# Patient Record
Sex: FEMALE | Race: AMERICAN INDIAN OR ALASKA NATIVE | NOT HISPANIC OR LATINO | Employment: OTHER | URBAN - METROPOLITAN AREA
[De-identification: names, ages, dates, MRNs, and addresses within clinical notes are randomized per-mention and may not be internally consistent; named-entity substitution may affect disease eponyms.]

---

## 2017-06-09 ENCOUNTER — HOSPITAL ENCOUNTER (OUTPATIENT)
Dept: RADIOLOGY | Facility: HOSPITAL | Age: 67
Discharge: HOME | End: 2017-06-09
Attending: NURSE PRACTITIONER
Payer: MEDICARE

## 2017-06-09 DIAGNOSIS — Z12.31 ENCOUNTER FOR SCREENING MAMMOGRAM FOR BREAST CANCER: ICD-10-CM

## 2017-06-09 PROCEDURE — 77063 BREAST TOMOSYNTHESIS BI: CPT | Mod: 26,,, | Performed by: RADIOLOGY

## 2017-06-09 PROCEDURE — 77067 SCR MAMMO BI INCL CAD: CPT | Mod: TC

## 2017-06-09 PROCEDURE — 77067 SCR MAMMO BI INCL CAD: CPT | Mod: 26,,, | Performed by: RADIOLOGY

## 2017-06-27 DIAGNOSIS — M85.80 OSTEOPENIA WITH HIGH RISK OF FRACTURE: ICD-10-CM

## 2017-06-28 RX ORDER — IBANDRONATE SODIUM 150 MG/1
TABLET, FILM COATED ORAL
Qty: 1 TABLET | Refills: 10 | OUTPATIENT
Start: 2017-06-28

## 2017-06-30 DIAGNOSIS — M85.80 OSTEOPENIA WITH HIGH RISK OF FRACTURE: ICD-10-CM

## 2017-06-30 RX ORDER — IBANDRONATE SODIUM 150 MG/1
150 TABLET, FILM COATED ORAL
Qty: 1 TABLET | Refills: 0 | Status: SHIPPED | OUTPATIENT
Start: 2017-06-30 | End: 2018-06-30

## 2017-06-30 NOTE — TELEPHONE ENCOUNTER
Angela desire refill of Boniva. Annual exam scheduled.   Patient Active Problem List   Diagnosis    Hypertension    Osteopenia    Colon cancer screening    Screening for colorectal cancer     Prior to Admission medications    Medication Sig Start Date End Date Taking? Authorizing Provider   albuterol-ipratropium 2.5mg-0.5mg/3mL (DUO-NEB) 0.5 mg-3 mg(2.5 mg base)/3 mL nebulizer solution every 6 (six) hours as needed.  4/21/14   Historical Provider, MD   b complex vitamins tablet Take 1 tablet by mouth once daily.    Historical Provider, MD   cholecalciferol, vitamin D3, (VITAMIN D3) 2,000 unit Tab Take 1 tablet by mouth once daily.    Historical Provider, MD   DEPO-ESTRADIOL 5 mg/mL injection Inject 1 mL (5 mg total) into the muscle every 28 days. 6/5/15   Ashley Narayanan MD   ibandronate (BONIVA) 150 mg tablet Take 1 tablet (150 mg total) by mouth every 30 days. 6/21/16 6/21/17  Ashley Narayanan MD   losartan-hydrochlorothiazide 50-12.5 mg (HYZAAR) 50-12.5 mg per tablet Take 1 tablet by mouth once daily.    Historical Provider, MD   multivitamin with minerals tablet Take 1 tablet by mouth once daily.    Historical Provider, MD

## 2017-09-16 ENCOUNTER — HOSPITAL ENCOUNTER (EMERGENCY)
Facility: HOSPITAL | Age: 67
Discharge: HOME | End: 2017-09-16
Attending: SURGERY
Payer: MEDICARE

## 2017-09-16 VITALS
DIASTOLIC BLOOD PRESSURE: 75 MMHG | BODY MASS INDEX: 29.09 KG/M2 | WEIGHT: 183 LBS | SYSTOLIC BLOOD PRESSURE: 140 MMHG | TEMPERATURE: 97 F | HEART RATE: 68 BPM | RESPIRATION RATE: 18 BRPM

## 2017-09-16 DIAGNOSIS — M25.569 KNEE PAIN: ICD-10-CM

## 2017-09-16 PROCEDURE — 99283 EMERGENCY DEPT VISIT LOW MDM: CPT

## 2017-09-16 RX ORDER — KETOROLAC TROMETHAMINE 10 MG/1
10 TABLET, FILM COATED ORAL EVERY 6 HOURS PRN
Qty: 15 TABLET | Refills: 0 | Status: SHIPPED | OUTPATIENT
Start: 2017-09-16

## 2017-09-16 RX ORDER — CYCLOBENZAPRINE HCL 10 MG
10 TABLET ORAL 3 TIMES DAILY PRN
Qty: 10 TABLET | Refills: 0 | Status: SHIPPED | OUTPATIENT
Start: 2017-09-16 | End: 2017-09-21

## 2017-09-16 NOTE — ED PROVIDER NOTES
[facility]                                     September 16, 2017                   Chief Complaint  66 y.o. female with Knee Pain (right)    History of Present Illness  Angela Veliz presents to the emergency room with right knee pain today  Patient on his monitor slip and fall or getting off of her scale, hit her right knee at home  Patient on exam has a normal right knee without bruising or deformity noted in the ER   Patient has a negative anterior and posterior drawer sign, no instability appreciated now  Patient has good distal pulses and capillary refill, neurovascular intact on evaluation here    The history is provided by the patient  Medical Hx: Arthritis, asthma, difficult intubation right shin, osteopenia  Surgical Hx: Bartholin's cyst, abscess, colonoscopy, D&C, RODRIGO, joint replacement, tonsillectomy  ALLERGIES: Iodine    Review of Systems and Physical Exam     Review of Systems  -- Constitution - no fever, denies fatigue, no weakness, no chills  -- Eyes - no tearing or redness, no visual disturbance  -- Ear, Nose - no tinnitus or earache, no nasal congestion or discharge  -- Mouth,Throat - no sore throat, no toothache, normal voice, normal swallowing  -- Respiratory - denies cough and congestion, no shortness of breath, no MAX  -- Cardiovascular - denies chest pain, no palpitations, denies claudication  -- Gastrointestinal - denies abdominal pain, nausea, vomiting, or diarrhea  -- Musculoskeletal - left knee pain after a trip and fall today   -- Neurological - no headache, denies weakness or seizure; no LOC  -- Skin - denies pallor, rash, or changes in skin. no hives or welts noted    Vital Signs  -- Her blood pressure is 140/75 and her pulse is 68. Her respiration is 18.      Physical Exam  -- Nursing note and vitals reviewed  -- Constitutional: Appears well-developed and well-nourished  -- Head: Atraumatic. Normocephalic. No obvious abnormality  -- Eyes: Pupils are  equal and reactive to light. Normal conjunctiva and lids  -- Cardiac: Normal rate, regular rhythm and normal heart sounds  -- Pulmonary: Normal respiratory effort, breath sounds clear to auscultation  -- Abdominal: Soft, no tenderness. Normal bowel sounds. Normal liver edge  -- Musculoskeletal: Normal range of motion, no effusions. Joints stable   -- Neurological: No focal deficits. Showed good interaction with staff  -- Vascular: Posterior tibial, dorsalis pedis and radial pulses 2+ bilaterally      Emergency Room Course     Treatment and Evaluation  -- Preliminary ER x-ray readings showed no evidence of fracture or dislocation  -- All x-rays are reviewed with a final disposition given by the radiologist   -- A knee immobilizer is placed on the affected knee by the CNA  -- Crutches were also given and taught for ambulation      Diagnosis  -- The encounter diagnosis was Knee pain.    Disposition and Plan  -- Disposition: home  -- Condition: stable  -- Follow-up: Patient to follow up with Rigoberto Koenig MD in 1-2 days.  -- I advised the patient that we have found no life threatening condition today  -- At this time, I believe the patient is clinically stable for discharge.   -- The patient acknowledges that close follow up with a MD is required   -- Patient agrees to comply with all instruction and direction given in the ER    This note is dictated on Dragon Natural Speaking word recognition program.  There are word recognition mistakes that are occasionally missed on review.           Mikey Garza MD  09/16/17 9443

## 2017-09-16 NOTE — ED TRIAGE NOTES
"Patient c/o right knee pain that began "about a month ago but it went away and started up again."  "

## 2017-09-16 NOTE — ED NOTES
Discharge instructions/escript instructions given to patient, she voiced understanding.  Discharged to home in stable condition/ambulatory w steady gait to discharge window using crutches.  Assisted w carrying personal items and knee immobilizer to discharge window and to POV per TOMAS Llanes, PCT.  NAD.

## 2017-09-16 NOTE — ED NOTES
Knee immobilizer given to patient w instructions r/t patient does not have a ride home and will be driving herself.  Patient voiced understanding of application of knee immobilizer.

## 2018-07-03 ENCOUNTER — HOSPITAL ENCOUNTER (OUTPATIENT)
Dept: RADIOLOGY | Facility: HOSPITAL | Age: 68
Discharge: HOME | End: 2018-07-03
Attending: NURSE PRACTITIONER
Payer: MEDICARE

## 2018-07-03 VITALS — WEIGHT: 183 LBS | BODY MASS INDEX: 28.72 KG/M2 | HEIGHT: 67 IN

## 2018-07-03 DIAGNOSIS — Z78.0 ASYMPTOMATIC MENOPAUSAL STATE: ICD-10-CM

## 2018-07-03 DIAGNOSIS — Z12.39 SCREENING FOR MALIGNANT NEOPLASM OF BREAST: ICD-10-CM

## 2018-07-03 PROCEDURE — 77080 DXA BONE DENSITY AXIAL: CPT | Mod: TC

## 2018-07-03 PROCEDURE — 77080 DXA BONE DENSITY AXIAL: CPT | Mod: 26,,, | Performed by: RADIOLOGY

## 2018-07-03 PROCEDURE — 77063 BREAST TOMOSYNTHESIS BI: CPT | Mod: 26,,, | Performed by: RADIOLOGY

## 2018-07-03 PROCEDURE — 77067 SCR MAMMO BI INCL CAD: CPT | Mod: TC

## 2018-07-03 PROCEDURE — 77067 SCR MAMMO BI INCL CAD: CPT | Mod: 26,,, | Performed by: RADIOLOGY

## 2019-01-12 ENCOUNTER — HOSPITAL ENCOUNTER (INPATIENT)
Facility: HOSPITAL | Age: 69
LOS: 1 days | Discharge: HOME | DRG: 390 | End: 2019-01-13
Attending: SURGERY | Admitting: SURGERY
Payer: MEDICARE

## 2019-01-12 ENCOUNTER — HOSPITAL ENCOUNTER (EMERGENCY)
Facility: HOSPITAL | Age: 69
Discharge: ANOTHER HOSPITAL | End: 2019-01-12
Attending: INTERNAL MEDICINE
Payer: MEDICARE

## 2019-01-12 VITALS
DIASTOLIC BLOOD PRESSURE: 72 MMHG | WEIGHT: 165 LBS | RESPIRATION RATE: 19 BRPM | SYSTOLIC BLOOD PRESSURE: 138 MMHG | HEART RATE: 61 BPM | BODY MASS INDEX: 26.52 KG/M2 | HEIGHT: 66 IN | TEMPERATURE: 97 F | OXYGEN SATURATION: 100 %

## 2019-01-12 DIAGNOSIS — R10.9 ABDOMINAL PAIN: ICD-10-CM

## 2019-01-12 DIAGNOSIS — K56.609 SBO (SMALL BOWEL OBSTRUCTION): Primary | ICD-10-CM

## 2019-01-12 DIAGNOSIS — K56.609 SMALL BOWEL OBSTRUCTION: Primary | ICD-10-CM

## 2019-01-12 LAB
ALBUMIN SERPL BCP-MCNC: 3.8 G/DL
ALP SERPL-CCNC: 119 U/L
ALT SERPL W/O P-5'-P-CCNC: 35 U/L
ANION GAP SERPL CALC-SCNC: 8 MMOL/L
AST SERPL-CCNC: 36 U/L
BASOPHILS # BLD AUTO: 0.01 K/UL
BASOPHILS NFR BLD: 0.3 %
BILIRUB SERPL-MCNC: 0.4 MG/DL
BILIRUB UR QL STRIP: NEGATIVE
BNP SERPL-MCNC: 64 PG/ML
BUN SERPL-MCNC: 19 MG/DL
CALCIUM SERPL-MCNC: 8.4 MG/DL
CHLORIDE SERPL-SCNC: 107 MMOL/L
CK MB SERPL-MCNC: 1 NG/ML
CK MB SERPL-RTO: 1.1 %
CK SERPL-CCNC: 93 U/L
CK SERPL-CCNC: 93 U/L
CLARITY UR: ABNORMAL
CO2 SERPL-SCNC: 24 MMOL/L
COLOR UR: YELLOW
CREAT SERPL-MCNC: 0.8 MG/DL
DIFFERENTIAL METHOD: ABNORMAL
EOSINOPHIL # BLD AUTO: 0.1 K/UL
EOSINOPHIL NFR BLD: 1.5 %
ERYTHROCYTE [DISTWIDTH] IN BLOOD BY AUTOMATED COUNT: 16.7 %
EST. GFR  (AFRICAN AMERICAN): >60 ML/MIN/1.73 M^2
EST. GFR  (NON AFRICAN AMERICAN): >60 ML/MIN/1.73 M^2
GLUCOSE SERPL-MCNC: 112 MG/DL
GLUCOSE UR QL STRIP: NEGATIVE
HCT VFR BLD AUTO: 35.3 %
HGB BLD-MCNC: 11.4 G/DL
HGB UR QL STRIP: NEGATIVE
KETONES UR QL STRIP: ABNORMAL
LACTATE SERPL-SCNC: 0.5 MMOL/L
LEUKOCYTE ESTERASE UR QL STRIP: NEGATIVE
LIPASE SERPL-CCNC: 29 U/L
LYMPHOCYTES # BLD AUTO: 0.9 K/UL
LYMPHOCYTES NFR BLD: 26.9 %
MCH RBC QN AUTO: 26 PG
MCHC RBC AUTO-ENTMCNC: 32.3 G/DL
MCV RBC AUTO: 80 FL
MONOCYTES # BLD AUTO: 0.5 K/UL
MONOCYTES NFR BLD: 15.8 %
NEUTROPHILS # BLD AUTO: 1.9 K/UL
NEUTROPHILS NFR BLD: 55.5 %
NITRITE UR QL STRIP: NEGATIVE
PH UR STRIP: 7 [PH] (ref 5–8)
PLATELET # BLD AUTO: 368 K/UL
PMV BLD AUTO: 8.7 FL
POTASSIUM SERPL-SCNC: 3.8 MMOL/L
PROT SERPL-MCNC: 7.4 G/DL
PROT UR QL STRIP: NEGATIVE
RBC # BLD AUTO: 4.39 M/UL
SODIUM SERPL-SCNC: 139 MMOL/L
SP GR UR STRIP: 1.02 (ref 1–1.03)
TROPONIN I SERPL DL<=0.01 NG/ML-MCNC: <0.006 NG/ML
URN SPEC COLLECT METH UR: ABNORMAL
UROBILINOGEN UR STRIP-ACNC: NEGATIVE EU/DL
WBC # BLD AUTO: 3.42 K/UL

## 2019-01-12 PROCEDURE — 36415 COLL VENOUS BLD VENIPUNCTURE: CPT

## 2019-01-12 PROCEDURE — 81003 URINALYSIS AUTO W/O SCOPE: CPT

## 2019-01-12 PROCEDURE — 99285 EMERGENCY DEPT VISIT HI MDM: CPT | Mod: 25

## 2019-01-12 PROCEDURE — 96365 THER/PROPH/DIAG IV INF INIT: CPT

## 2019-01-12 PROCEDURE — 25500020 PHARM REV CODE 255: Performed by: SURGERY

## 2019-01-12 PROCEDURE — C9113 INJ PANTOPRAZOLE SODIUM, VIA: HCPCS | Performed by: STUDENT IN AN ORGANIZED HEALTH CARE EDUCATION/TRAINING PROGRAM

## 2019-01-12 PROCEDURE — 83605 ASSAY OF LACTIC ACID: CPT

## 2019-01-12 PROCEDURE — 85025 COMPLETE CBC W/AUTO DIFF WBC: CPT

## 2019-01-12 PROCEDURE — 96361 HYDRATE IV INFUSION ADD-ON: CPT

## 2019-01-12 PROCEDURE — 11000001 HC ACUTE MED/SURG PRIVATE ROOM

## 2019-01-12 PROCEDURE — 25000003 PHARM REV CODE 250: Performed by: SURGERY

## 2019-01-12 PROCEDURE — 80053 COMPREHEN METABOLIC PANEL: CPT

## 2019-01-12 PROCEDURE — 63600175 PHARM REV CODE 636 W HCPCS: Performed by: SURGERY

## 2019-01-12 PROCEDURE — A9698 NON-RAD CONTRAST MATERIALNOC: HCPCS | Performed by: SURGERY

## 2019-01-12 PROCEDURE — 25000003 PHARM REV CODE 250: Performed by: STUDENT IN AN ORGANIZED HEALTH CARE EDUCATION/TRAINING PROGRAM

## 2019-01-12 PROCEDURE — 93005 ELECTROCARDIOGRAM TRACING: CPT

## 2019-01-12 PROCEDURE — 83690 ASSAY OF LIPASE: CPT

## 2019-01-12 PROCEDURE — 84484 ASSAY OF TROPONIN QUANT: CPT

## 2019-01-12 PROCEDURE — 82553 CREATINE MB FRACTION: CPT

## 2019-01-12 PROCEDURE — 63600175 PHARM REV CODE 636 W HCPCS: Performed by: STUDENT IN AN ORGANIZED HEALTH CARE EDUCATION/TRAINING PROGRAM

## 2019-01-12 PROCEDURE — 94761 N-INVAS EAR/PLS OXIMETRY MLT: CPT

## 2019-01-12 PROCEDURE — 93010 ELECTROCARDIOGRAM REPORT: CPT | Mod: ,,, | Performed by: INTERNAL MEDICINE

## 2019-01-12 PROCEDURE — 93010 EKG 12-LEAD: ICD-10-PCS | Mod: ,,, | Performed by: INTERNAL MEDICINE

## 2019-01-12 PROCEDURE — 82550 ASSAY OF CK (CPK): CPT

## 2019-01-12 PROCEDURE — 99285 EMERGENCY DEPT VISIT HI MDM: CPT | Mod: 25,27

## 2019-01-12 PROCEDURE — 83880 ASSAY OF NATRIURETIC PEPTIDE: CPT

## 2019-01-12 PROCEDURE — 96374 THER/PROPH/DIAG INJ IV PUSH: CPT

## 2019-01-12 RX ORDER — DIPHENHYDRAMINE HYDROCHLORIDE 50 MG/ML
25 INJECTION INTRAMUSCULAR; INTRAVENOUS ONCE
Status: COMPLETED | OUTPATIENT
Start: 2019-01-12 | End: 2019-01-13

## 2019-01-12 RX ORDER — LIDOCAINE HYDROCHLORIDE 10 MG/ML
1 INJECTION INFILTRATION; PERINEURAL ONCE
Status: DISCONTINUED | OUTPATIENT
Start: 2019-01-12 | End: 2019-01-13 | Stop reason: HOSPADM

## 2019-01-12 RX ORDER — ENOXAPARIN SODIUM 100 MG/ML
40 INJECTION SUBCUTANEOUS EVERY 24 HOURS
Status: DISCONTINUED | OUTPATIENT
Start: 2019-01-12 | End: 2019-01-13 | Stop reason: HOSPADM

## 2019-01-12 RX ORDER — PANTOPRAZOLE SODIUM 40 MG/10ML
40 INJECTION, POWDER, LYOPHILIZED, FOR SOLUTION INTRAVENOUS DAILY
Status: DISCONTINUED | OUTPATIENT
Start: 2019-01-12 | End: 2019-01-13 | Stop reason: HOSPADM

## 2019-01-12 RX ORDER — HYDRALAZINE HYDROCHLORIDE 20 MG/ML
10 INJECTION INTRAMUSCULAR; INTRAVENOUS EVERY 6 HOURS PRN
Status: DISCONTINUED | OUTPATIENT
Start: 2019-01-12 | End: 2019-01-13 | Stop reason: HOSPADM

## 2019-01-12 RX ORDER — KETOROLAC TROMETHAMINE 30 MG/ML
10 INJECTION, SOLUTION INTRAMUSCULAR; INTRAVENOUS EVERY 6 HOURS PRN
Status: DISCONTINUED | OUTPATIENT
Start: 2019-01-12 | End: 2019-01-13 | Stop reason: HOSPADM

## 2019-01-12 RX ORDER — MORPHINE SULFATE 2 MG/ML
2 INJECTION, SOLUTION INTRAMUSCULAR; INTRAVENOUS
Status: COMPLETED | OUTPATIENT
Start: 2019-01-12 | End: 2019-01-12

## 2019-01-12 RX ORDER — DIPHENHYDRAMINE HYDROCHLORIDE 50 MG/ML
25 INJECTION INTRAMUSCULAR; INTRAVENOUS EVERY 6 HOURS PRN
Status: DISCONTINUED | OUTPATIENT
Start: 2019-01-12 | End: 2019-01-13 | Stop reason: HOSPADM

## 2019-01-12 RX ORDER — SODIUM CHLORIDE, SODIUM LACTATE, POTASSIUM CHLORIDE, CALCIUM CHLORIDE 600; 310; 30; 20 MG/100ML; MG/100ML; MG/100ML; MG/100ML
INJECTION, SOLUTION INTRAVENOUS CONTINUOUS
Status: DISCONTINUED | OUTPATIENT
Start: 2019-01-12 | End: 2019-01-13 | Stop reason: HOSPADM

## 2019-01-12 RX ORDER — IPRATROPIUM BROMIDE AND ALBUTEROL SULFATE 2.5; .5 MG/3ML; MG/3ML
3 SOLUTION RESPIRATORY (INHALATION) EVERY 6 HOURS PRN
Status: DISCONTINUED | OUTPATIENT
Start: 2019-01-12 | End: 2019-01-13 | Stop reason: HOSPADM

## 2019-01-12 RX ADMIN — MORPHINE SULFATE 2 MG: 2 INJECTION, SOLUTION INTRAMUSCULAR; INTRAVENOUS at 02:01

## 2019-01-12 RX ADMIN — Medication 450 ML: at 09:01

## 2019-01-12 RX ADMIN — PANTOPRAZOLE SODIUM 40 MG: 40 INJECTION, POWDER, FOR SOLUTION INTRAVENOUS at 04:01

## 2019-01-12 RX ADMIN — KETOROLAC TROMETHAMINE 10 MG: 30 INJECTION, SOLUTION INTRAMUSCULAR at 05:01

## 2019-01-12 RX ADMIN — SODIUM CHLORIDE, SODIUM LACTATE, POTASSIUM CHLORIDE, AND CALCIUM CHLORIDE: .6; .31; .03; .02 INJECTION, SOLUTION INTRAVENOUS at 04:01

## 2019-01-12 RX ADMIN — SODIUM CHLORIDE 500 ML: 0.9 INJECTION, SOLUTION INTRAVENOUS at 08:01

## 2019-01-12 RX ADMIN — PROMETHAZINE HYDROCHLORIDE 12.5 MG: 25 INJECTION INTRAMUSCULAR; INTRAVENOUS at 09:01

## 2019-01-12 RX ADMIN — ENOXAPARIN SODIUM 40 MG: 100 INJECTION SUBCUTANEOUS at 04:01

## 2019-01-12 NOTE — ED PROVIDER NOTES
[facility]                                                 Chief Complaint  68 y.o. female with Abdominal Pain    History of Present Illness  Angela Veliz presents to the emergency room with abdominal pain  The patient states that she has had nausea and vomiting all day today since the am  CT this morning showed closed loop small bowel obstruction with an internal hernia  Pt has active nausea vomiting and diminished bowel sounds, stat surgery consult    The history is provided by the patient  Medical Hx: Arthritis, asthma, difficult intubation right shin, osteopenia  Surgical Hx: Bartholin's cyst, abscess, colonoscopy, D&C, RODRIGO, joint replacement, tonsillectomy  ALLERGIES: Iodine    Review of Systems and Physical Exam      Review of Systems  -- Constitution - no fever, denies fatigue, no weakness, no chills  -- Eyes - no tearing or redness, no visual disturbance  -- Ear, Nose - no tinnitus or earache, no nasal congestion or discharge  -- Mouth,Throat - no sore throat, no toothache, normal voice, normal swallowing  -- Respiratory - denies cough and congestion, no shortness of breath, no MAX  -- Cardiovascular - denies chest pain, no palpitations, denies claudication  -- Gastrointestinal - lower abdominal pain, nausea, vomiting, no diarrhea  -- Genitourinary - no dysuria, no hematuria, no flank pain, no bladder pain  -- Musculoskeletal - denies back pain, negative for trauma or injury  -- Neurological - no headache, denies weakness or seizure; no LOC  -- Skin - denies pallor, rash, or changes in skin. no hives or welts noted    Vital Signs  Her oral temperature is 96.7 °F (35.9 °C).   Her blood pressure is 101/63 and her pulse is 61.   Her respiration is 16 and oxygen saturation is 100%.     Physical Exam  -- Nursing note and vitals reviewed  -- Constitutional: Appears well-developed and well-nourished  -- Head: Atraumatic. Normocephalic. No obvious abnormality  -- Eyes: Pupils are  equal and reactive to light. Normal conjunctiva and lids  -- Cardiac: Normal rate, regular rhythm and normal heart sounds  -- Pulmonary: Normal respiratory effort, breath sounds clear to auscultation  -- Abdominal: Soft, no tenderness. Normal bowel sounds. Normal liver edge  -- Genitourinary:  Hypoactive bowel sounds with diffuse tenderness  -- Musculoskeletal: Normal range of motion, no effusions. Joints stable   -- Neurological: No focal deficits. Showed good interaction with staff  -- Skin: Warm and dry. No evidence of rash or cellulitis    Emergency Room Course      Lab Results     K 3.8      CO2 24   BUN 19   CREATININE 0.8    (H)   ALKPHOS 119   AST 36   ALT 35   BILITOT 0.4   ALBUMIN 3.8   PROT 7.4   WBC 3.42 (L)   HGB 11.4 (L)   HCT 35.3 (L)    (H)   CPK 93   CPK 93   CPKMB 1.0   TROPONINI <0.006   BNP 64     Urinalysis  -- Urinalysis performed during this ER visit showed no signs of infection      EKG  -- The EKG findings today were without concerning findings from baseline    CT Abdomen Pelvis  Small-bowel obstruction, with configuration raising consideration for a closed loop obstruction in the setting of an internal hernia within the low abdomen.     Medications Given  sodium chloride 0.9% bolus 500 mL (0 mLs Intravenous Stopped 1/12/19 0930)   promethazine (PHENERGAN) 12.5 mg in dextrose 5 % 50 mL IVPB (0 mg Intravenous Stopped 1/12/19 1015)   barium 2.1 % (w/v), 2.0 % (w/w) suspension 450 mL (450 mLs Oral Given 1/12/19 0930)   barium 2.1 % (w/v), 2.0 % (w/w) suspension 450 mL (450 mLs Oral Given 1/12/19 0900)     Diagnosis  -- The primary encounter diagnosis was SBO (small bowel obstruction).   -- A diagnosis of Abdominal pain was also pertinent to this visit.    Disposition and Plan  -- Disposition: transfer  -- Condition: stable    This note is dictated on M*Modal word recognition program.  There are word recognition mistakes that are occasionally missed on review.           Mikey Garza MD  01/12/19 7302

## 2019-01-12 NOTE — ED NOTES
The patient is awake, alert and cooperative with a calm affect, patient is aware of environment, airway is open and patent, respirations are spontaneous, normal effort and rate noted, skin warm and dry, moves all extremities well, appearance: no apparent distress noted, bed placed in low position, side rails up x 2, call light is within reach of patient or family, explanation of care provided to family and patient, alarms set and turned on for monitor, pulse ox, plan of care: family to bedside, observe and reassure, position of comfort, patient offers no complaints at this time

## 2019-01-12 NOTE — H&P
General Surgery Service  H&P      Chief Complaint:  Abdominal Pain    HPI:  68F xfer from [address] for adhesive SBO, symptoms <24hr, concern for internal hernia on CT scan  Complains of abdominal pain that localized to lower abdomen, not associated with fevers/chills/nausea/vomiting  Only 1 episode of emesis this morning with contrast  Had small bowel movements and passed small amount of flatus early this AM  Complains of abdominal pain, prior history of surgery hysterectomy  Last colonoscopy unknown exact date, has had 3 total    PMH:  Past Medical History:   Diagnosis Date    Arthritis     Asthma     Breast cyst     Difficult intubation     Endometrial cancer     Hypertension     Osteopenia          PSH:  Past Surgical History:   Procedure Laterality Date    ABCESS DRAINAGE      boil on buttocks    BARTHOLIN GLAND CYST EXCISION      COLONOSCOPY  2013; 2014    3 polyps    COLONOSCOPY N/A 6/30/2016    [facility] at [facility]    DILATION AND CURETTAGE OF UTERUS  1975    HYSTERECTOMY  1991    [facility]SO-fibroids    JOINT REPLACEMENT Left 2012    Partial replacement    POLYPECTOMY N/A 6/30/2016    [facility] at [facility]    TONSILLECTOMY, ADENOIDECTOMY  1957         FH:  Family History   Problem Relation Age of Onset    Breast cancer Maternal Grandmother     Cancer Father         Lung    Diabetes Mother     Breast cancer Mother     Hypertension Mother     Heart disease Mother     Cancer Sister         Uterine    Breast cancer Maternal Aunt     Colon cancer Neg Hx     Ovarian cancer Neg Hx          Social:  Social History     Socioeconomic History    Marital status:      Spouse name: Not on file    Number of children: Not on file    Years of education: Not on file    Highest education level: Not on file   Social Needs    Financial resource strain: Not on file    Food insecurity - worry: Not on file    Food insecurity - inability: Not on file     Transportation needs - medical: Not on file    Transportation needs - non-medical: Not on file   Occupational History    Not on file   Tobacco Use    Smoking status: Never Smoker    Smokeless tobacco: Never Used   Substance and Sexual Activity    Alcohol use: No    Drug use: No    Sexual activity: Yes     Partners: Male     Birth control/protection: Surgical     Comment:    Other Topics Concern    Not on file   Social History Narrative    Not on file         Allergies:  Review of patient's allergies indicates:   Allergen Reactions    Iodine and iodide containing products Itching and Swelling     Seafood - throat swells, eyes watery, sneezing, itchy throat         Meds:  Current Facility-Administered Medications on File Prior to Encounter   Medication Dose Route Frequency Provider Last Rate Last Dose    [COMPLETED] barium 2.1 % (w/v), 2.0 % (w/w) suspension 450 mL  450 mL Oral Once Mikey Garza MD   450 mL at 01/12/19 0930    [COMPLETED] barium 2.1 % (w/v), 2.0 % (w/w) suspension 450 mL  450 mL Oral Once Mikey Garza MD   450 mL at 01/12/19 0900    [COMPLETED] promethazine (PHENERGAN) 12.5 mg in dextrose 5 % 50 mL IVPB  12.5 mg Intravenous ED 1 Time Mikey Garza MD   Stopped at 01/12/19 1015    [COMPLETED] sodium chloride 0.9% bolus 500 mL  500 mL Intravenous ED 1 Time Mikey Garza MD   Stopped at 01/12/19 0930     Current Outpatient Medications on File Prior to Encounter   Medication Sig Dispense Refill    albuterol-ipratropium 2.5mg-0.5mg/3mL (DUO-NEB) 0.5 mg-3 mg(2.5 mg base)/3 mL nebulizer solution every 6 (six) hours as needed.       b complex vitamins tablet Take 1 tablet by mouth once daily.      cholecalciferol, vitamin D3, (VITAMIN D3) 2,000 unit Tab Take 1 tablet by mouth once daily.      DEPO-ESTRADIOL 5 mg/mL injection Inject 1 mL (5 mg total) into the muscle every 28 days. 5 mL 11    ketorolac (TORADOL) 10 mg tablet Take 1 tablet (10 mg total) by mouth every  "6 (six) hours as needed for Pain. 15 tablet 0    losartan-hydrochlorothiazide 50-12.5 mg (HYZAAR) 50-12.5 mg per tablet Take 1 tablet by mouth once daily.      multivitamin with minerals tablet Take 1 tablet by mouth once daily.      ibandronate (BONIVA) 150 mg tablet Take 1 tablet (150 mg total) by mouth every 30 days. 1 tablet 0         ROS:  Review of Systems   Constitutional: Positive for malaise/fatigue. Negative for chills, fever and weight loss.   HENT: Negative for nosebleeds and sore throat.    Respiratory: Negative for cough, sputum production, shortness of breath and wheezing.    Gastrointestinal: Positive for abdominal pain, constipation, nausea and vomiting. Negative for blood in stool, diarrhea and melena.   Genitourinary: Negative for dysuria and urgency.   Skin: Negative for itching and rash.   Neurological: Positive for weakness. Negative for focal weakness and loss of consciousness.         Vitals:  Vitals:    01/12/19 1340 01/12/19 1440   BP: (!) 168/80 (!) 145/74   BP Location: Right arm    Patient Position: Sitting    Pulse: 88 85   Resp: 18    Temp: 98.5 °F (36.9 °C)    TempSrc: Oral    SpO2: 97% 98%   Weight: 72.6 kg (160 lb)    Height: 5' 5" (1.651 m)      Physical Exam:  Gen - No acute distress, awake/alert/oriented  HEENT - Normocephalic, atraumatic, + NGT  CV - Regular rate and rhythm  Resp - normal work of breathing  Abd - Soft, mildly tender to deep palpation BLQ, mildly distended, + surgical scar bikini line - no rebound or guarding  Ext - Warm and well perfused  G/U - no mendez catheter in place  Vasc - palpable distal pulses BUE    Physical Exam    Recent Labs   Lab 01/12/19  0757   WBC 3.42*   RBC 4.39   HGB 11.4*   HCT 35.3*   *   MCV 80*   MCH 26.0*   MCHC 32.3     Recent Labs   Lab 01/12/19  0757      K 3.8      CO2 24   BUN 19   CREATININE 0.8     Recent Labs   Lab 01/12/19  0757   CALCIUM 8.4*   PROT 7.4      K 3.8   CO2 24      BUN 19 "   CREATININE 0.8   ALKPHOS 119   ALT 35   AST 36   BILITOT 0.4       Radiology:  Imaging Results          X-Ray Chest 1 View (Final result)  Result time 01/12/19 14:23:53    Final result by Gianfranco Yan MD (01/12/19 14:23:53)                 Impression:      As above      Electronically signed by: Gianfranco Yan MD  Date:    01/12/2019  Time:    14:23             Narrative:    EXAMINATION:  XR CHEST 1 VIEW    CLINICAL HISTORY:  NGT placement; Unspecified intestinal obstruction, unspecified as to partial versus complete obstruction    COMPARISON:  None    FINDINGS:  Nasogastric tube terminates within the stomach.  Chest unremarkable otherwise portable technique.                                  Micro:  Microbiology Results (last 7 days)     ** No results found for the last 168 hours. **              Assessment and Plan:  68F c adhesive small bowel obstruction  Admit to gen sx  NPO/NGT/IVF, serial abd exam, serial labs, checking lactate now  Concern for internal hernia on CT scan but with non-peritoneal exam, will monitor closely, may need surgery if declines  Neuro/Pain: Toradol prn, avoid narcotics  CV: HDS, hydralazine prn for HTN   Resp: restarted home asthma neb prn  FEN/GI: follow serial lytes  PPX: DVT lovenox 40 q D      GI protonix 40 qD  Dispo: continue inpatient care, await return of bowel function     Carroll Marks MD - HO4

## 2019-01-12 NOTE — PROGRESS NOTES
Patient is a transfer from [place].  Patient has complaints of pain to lower abdomen.  Patient's NGT is connected to continuos wall suction.  On IVF.  Daughter at bedside.  Safety is maintained with bed low, wheels locked and side rails up.  Call light within reach.  Bed alarm on.  Instructed patient to call for any questions or any assistance.  Patient verbalized understanding.  Will notify nurse that patient wants something for pain.

## 2019-01-12 NOTE — ED NOTES
Multiple attempts made to contact Martha Almaraz with number provided by transfer center, Not successful

## 2019-01-13 VITALS
HEART RATE: 76 BPM | RESPIRATION RATE: 18 BRPM | SYSTOLIC BLOOD PRESSURE: 158 MMHG | TEMPERATURE: 99 F | WEIGHT: 165.56 LBS | HEIGHT: 65 IN | DIASTOLIC BLOOD PRESSURE: 80 MMHG | BODY MASS INDEX: 27.58 KG/M2 | OXYGEN SATURATION: 95 %

## 2019-01-13 LAB
ANION GAP SERPL CALC-SCNC: 7 MMOL/L
BASOPHILS # BLD AUTO: 0.01 K/UL
BASOPHILS NFR BLD: 0.3 %
BUN SERPL-MCNC: 15 MG/DL
CALCIUM SERPL-MCNC: 8.1 MG/DL
CHLORIDE SERPL-SCNC: 111 MMOL/L
CO2 SERPL-SCNC: 23 MMOL/L
CREAT SERPL-MCNC: 0.8 MG/DL
DIFFERENTIAL METHOD: ABNORMAL
EOSINOPHIL # BLD AUTO: 0 K/UL
EOSINOPHIL NFR BLD: 0 %
ERYTHROCYTE [DISTWIDTH] IN BLOOD BY AUTOMATED COUNT: 16.7 %
EST. GFR  (AFRICAN AMERICAN): >60 ML/MIN/1.73 M^2
EST. GFR  (NON AFRICAN AMERICAN): >60 ML/MIN/1.73 M^2
GLUCOSE SERPL-MCNC: 82 MG/DL
HCT VFR BLD AUTO: 32 %
HGB BLD-MCNC: 10.3 G/DL
LYMPHOCYTES # BLD AUTO: 0.9 K/UL
LYMPHOCYTES NFR BLD: 25.1 %
MAGNESIUM SERPL-MCNC: 2.1 MG/DL
MCH RBC QN AUTO: 25.8 PG
MCHC RBC AUTO-ENTMCNC: 32.2 G/DL
MCV RBC AUTO: 80 FL
MONOCYTES # BLD AUTO: 0.6 K/UL
MONOCYTES NFR BLD: 16.6 %
NEUTROPHILS # BLD AUTO: 2.1 K/UL
NEUTROPHILS NFR BLD: 58 %
PHOSPHATE SERPL-MCNC: 3.6 MG/DL
PLATELET # BLD AUTO: 299 K/UL
PMV BLD AUTO: 8.6 FL
POTASSIUM SERPL-SCNC: 3.8 MMOL/L
RBC # BLD AUTO: 4 M/UL
SODIUM SERPL-SCNC: 141 MMOL/L
WBC # BLD AUTO: 3.67 K/UL

## 2019-01-13 PROCEDURE — 80048 BASIC METABOLIC PNL TOTAL CA: CPT

## 2019-01-13 PROCEDURE — 84100 ASSAY OF PHOSPHORUS: CPT

## 2019-01-13 PROCEDURE — 36415 COLL VENOUS BLD VENIPUNCTURE: CPT

## 2019-01-13 PROCEDURE — 25500020 PHARM REV CODE 255: Performed by: STUDENT IN AN ORGANIZED HEALTH CARE EDUCATION/TRAINING PROGRAM

## 2019-01-13 PROCEDURE — C9113 INJ PANTOPRAZOLE SODIUM, VIA: HCPCS | Performed by: STUDENT IN AN ORGANIZED HEALTH CARE EDUCATION/TRAINING PROGRAM

## 2019-01-13 PROCEDURE — 63600175 PHARM REV CODE 636 W HCPCS: Performed by: STUDENT IN AN ORGANIZED HEALTH CARE EDUCATION/TRAINING PROGRAM

## 2019-01-13 PROCEDURE — 85025 COMPLETE CBC W/AUTO DIFF WBC: CPT

## 2019-01-13 PROCEDURE — 83735 ASSAY OF MAGNESIUM: CPT

## 2019-01-13 PROCEDURE — 94761 N-INVAS EAR/PLS OXIMETRY MLT: CPT

## 2019-01-13 RX ORDER — ALENDRONATE SODIUM 70 MG/1
70 TABLET ORAL
COMMUNITY

## 2019-01-13 RX ORDER — GLUCOSAMINE/CHONDRO SU A 500-400 MG
1 TABLET ORAL DAILY
COMMUNITY

## 2019-01-13 RX ORDER — ESTRADIOL 1 MG/1
1 TABLET ORAL DAILY
COMMUNITY

## 2019-01-13 RX ADMIN — PANTOPRAZOLE SODIUM 40 MG: 40 INJECTION, POWDER, FOR SOLUTION INTRAVENOUS at 09:01

## 2019-01-13 RX ADMIN — DIPHENHYDRAMINE HYDROCHLORIDE 25 MG: 50 INJECTION, SOLUTION INTRAMUSCULAR; INTRAVENOUS at 12:01

## 2019-01-13 RX ADMIN — DIATRIZOATE MEGLUMINE AND DIATRIZOATE SODIUM 200 ML: 660; 100 LIQUID ORAL; RECTAL at 12:01

## 2019-01-13 NOTE — DISCHARGE SUMMARY
[facility] Neuroendocrine [facility]/General Surgery  Discharge Summary    Admit Date:   1/12/2019    Discharge Date:   1/13/2019    Attending Physician:   GERI Koenig MD    Consults:   none    Procedures Performed:   none    Hospital Course:   Patient is a 69 yo F, transfer from [facility] for concern for adhesive SBO, <24hr duration. Patient had concern for internal hernia on exam. Patient was admitted, NGT was placed with immediate relief achieved. Patient underwent small bowel follow through which demonstrated passage of contrast to the colon. Patient had return of bowel function and relief of symptoms. Patient tolerated a diet without pain, nausea/emesis. She was thus deemed amenable for discharge.     Final Diagnoses:  Active Hospital Problems    Diagnosis  POA    *Small bowel obstruction [K56.609]  Yes      Resolved Hospital Problems   No resolved problems to display.     Discharged Condition:   stable    Disposition:    Home or Self Care    Discharge Instructions:   Discharge Procedure Orders   Diet Adult Regular     Activity as tolerated     Follow-up Information     Schedule an appointment as soon as possible for a visit with ROSIE Koenig MD.    Specialty:  General Surgery  Why:  As needed  Contact information:  [address]  [facility] [address]  [facility] [address] [phone]                 Current Discharge Medication List      CONTINUE these medications which have NOT CHANGED    Details   albuterol-ipratropium 2.5mg-0.5mg/3mL (DUO-NEB) 0.5 mg-3 mg(2.5 mg base)/3 mL nebulizer solution every 6 (six) hours as needed.       alendronate (FOSAMAX) 70 MG tablet Take 70 mg by mouth every 7 days.      b complex vitamins tablet Take 1 tablet by mouth once daily.      cholecalciferol, vitamin D3, (VITAMIN D3) 2,000 unit Tab Take 1 tablet by mouth once daily.      DEPO-ESTRADIOL 5 mg/mL injection Inject 1 mL (5 mg total) into the muscle every 28 days.  Qty: 5 mL, Refills: 11    Associated Diagnoses: Menopausal symptoms       estradiol (ESTRACE) 1 MG tablet Take 1 mg by mouth once daily.      glucosamine-chondroitin 500-400 mg tablet Take 1 tablet by mouth once daily.      ketorolac (TORADOL) 10 mg tablet Take 1 tablet (10 mg total) by mouth every 6 (six) hours as needed for Pain.  Qty: 15 tablet, Refills: 0      losartan-hydrochlorothiazide 50-12.5 mg (HYZAAR) 50-12.5 mg per tablet Take 1 tablet by mouth once daily.      multivitamin with minerals tablet Take 1 tablet by mouth once daily.      ibandronate (BONIVA) 150 mg tablet Take 1 tablet (150 mg total) by mouth every 30 days.  Qty: 1 tablet, Refills: 0    Associated Diagnoses: Osteopenia with high risk of fracture             Ishan Vargas MD  General Surgery PGY-2

## 2019-01-13 NOTE — PLAN OF CARE
Discharge orders noted, no HH or HME ordered.    Pt's nurse will go over medications/signs and symptoms prior to discharge       01/13/19 1330   Final Note   Assessment Type Final Discharge Note   Anticipated Discharge Disposition Home   What phone number can be called within the next 1-3 days to see how you are doing after discharge? [phone]   Hospital Follow Up  Appt(s) scheduled? No  (pt to f/u with Dr Liberty DE ANDA)   Right Care Referral Info   Post Acute Recommendation No Care     Teresa Johnson, RN Transitional Navigator  [phone]

## 2019-01-13 NOTE — PROGRESS NOTES
[facility] General Surgery  Neuroendocrine Tumor Service  Progress Note     Admission Date: 1/12/2019  Hospital Length of Stay: 1  Principal Problem: <principal problem not specified>     Subjective:      Interval History:   NAEO  Denies nausea/vomiting  Denies any pain - states total relief  +flatus/+BM    Scheduled Meds:   enoxaparin  40 mg Subcutaneous Daily    lidocaine HCL 10 mg/ml (1%)  1 mL Other Once    pantoprazole  40 mg Intravenous Daily     Continuous Infusions:   lactated ringers 100 mL/hr at 01/12/19 1651     PRN Meds:.albuterol-ipratropium, diphenhydrAMINE, hydrALAZINE, ketorolac, promethazine (PHENERGAN) IVPB    Objective:      Temp:  [98.4 °F (36.9 °C)-99.1 °F (37.3 °C)] 98.7 °F (37.1 °C)  Pulse:  [59-91] 78  Resp:  [16-20] 18  SpO2:  [95 %-100 %] 95 %  BP: (132-168)/(72-84) 153/82  Weight change:     Intake/Output Summary (Last 24 hours) at 1/13/2019 0910  Last data filed at 1/13/2019 0730  Gross per 24 hour   Intake 1540 ml   Output 1600 ml   Net -60 ml       Physical Exam:  GEN: awake, alert, NAD, NGT in place  CV: RRR  PULM: CTAB  ABD: S/NT/ mild distention  EXT: Peripheral pulses present and equal bilaterally    Recent Labs   Lab 01/13/19  0412      K 3.8   CO2 23   *   BUN 15   CREATININE 0.8   CALCIUM 8.1*     Recent Labs   Lab 01/13/19  0412   WBC 3.67*   HGB 10.3*   HCT 32.0*          Significant Imaging: I have reviewed all pertinent imaging results/findings within the past 24 hours.     Assessment and Plan:      68 F w/ PMH of RODRIGO, now with SBO    Cont NGT for this morning  Serial abd exams  toradol prn  lovenox  PPI    Ishan Vargas MD  Neuroendocrine Surgery

## 2019-01-13 NOTE — PLAN OF CARE
Problem: Adult Inpatient Plan of Care  Goal: Plan of Care Review  Outcome: Ongoing (interventions implemented as appropriate)  Patient on room air with documented SATS and in no apparent distress. Will continue to monitor.

## 2019-01-13 NOTE — PLAN OF CARE
Problem: Adult Inpatient Plan of Care  Goal: Plan of Care Review  Outcome: Ongoing (interventions implemented as appropriate)  Pt AAOx4. Pt complains of abdominal discomfort. Pt remains NPO. NG tube remains in place to low continuous wall suction. Gastroview given at 0000. Pt had a BM at 0226. Pt states she is not having any abdominal pain anymore. NG tube draining dark red/brown drainage. LR infusing at 100 ml/hr. Plan of care discussed with pt. Bed locked in lowest position, bed alarm set and call bell within reach. Pt verbalized understanding to call for any needs or assistance. Will continue to monitor.

## 2019-01-13 NOTE — PROGRESS NOTES
Progress notes reviewed. Morning rounds completed. Introduced self as VN for this shift. Educated pt on VN's role in patient's care.  Plan of care reviewed with patient. Opportunity given for pt's questions. No questions or concerns expressed at this time. Safety precautions explained, instructed to call for assistance. Patient verbalizes understanding.  VN to continue to monitor.

## 2019-01-24 ENCOUNTER — HOSPITAL ENCOUNTER (OUTPATIENT)
Dept: RADIOLOGY | Facility: HOSPITAL | Age: 69
Discharge: HOME | End: 2019-01-24
Attending: INTERNAL MEDICINE
Payer: MEDICARE

## 2019-01-24 DIAGNOSIS — K56.609 SMALL BOWEL OBSTRUCTION: ICD-10-CM

## 2019-01-24 PROCEDURE — 74250 FL SMALL BOWEL FOLLOW THROUGH: ICD-10-PCS | Mod: 26,,, | Performed by: RADIOLOGY

## 2019-01-24 PROCEDURE — 74250 X-RAY XM SM INT 1CNTRST STD: CPT | Mod: 26,,, | Performed by: RADIOLOGY

## 2019-01-24 PROCEDURE — 74250 X-RAY XM SM INT 1CNTRST STD: CPT | Mod: TC

## 2019-07-12 ENCOUNTER — HOSPITAL ENCOUNTER (OUTPATIENT)
Dept: RADIOLOGY | Facility: HOSPITAL | Age: 69
Discharge: HOME | End: 2019-07-12
Attending: NURSE PRACTITIONER
Payer: MEDICARE

## 2019-07-12 VITALS — BODY MASS INDEX: 27.49 KG/M2 | HEIGHT: 65 IN | WEIGHT: 165 LBS

## 2019-07-12 DIAGNOSIS — Z12.31 ENCOUNTER FOR SCREENING MAMMOGRAM FOR MALIGNANT NEOPLASM OF BREAST: ICD-10-CM

## 2019-07-12 PROCEDURE — 77067 SCR MAMMO BI INCL CAD: CPT | Mod: 26,,, | Performed by: RADIOLOGY

## 2019-07-12 PROCEDURE — 77063 MAMMO DIGITAL SCREENING BILAT WITH TOMOSYNTHESIS_CAD: ICD-10-PCS | Mod: 26,,, | Performed by: RADIOLOGY

## 2019-07-12 PROCEDURE — 77067 SCR MAMMO BI INCL CAD: CPT | Mod: TC

## 2019-07-12 PROCEDURE — 77067 MAMMO DIGITAL SCREENING BILAT WITH TOMOSYNTHESIS_CAD: ICD-10-PCS | Mod: 26,,, | Performed by: RADIOLOGY

## 2019-07-12 PROCEDURE — 77063 BREAST TOMOSYNTHESIS BI: CPT | Mod: 26,,, | Performed by: RADIOLOGY

## 2019-07-19 ENCOUNTER — TELEPHONE (OUTPATIENT)
Dept: RADIOLOGY | Facility: HOSPITAL | Age: 69
End: 2019-07-19

## 2019-07-19 NOTE — TELEPHONE ENCOUNTER
Explained findings to patient.  The patient expressed understanding.  The patient is scheduled for her Diagnostic mammogram and/or Breast Ultrasound.  Diagnostic Appointment Date/time  July 29th at 7:50am.    A letter is being mailed to patient explaining findings and recommendations.

## 2019-07-29 ENCOUNTER — HOSPITAL ENCOUNTER (OUTPATIENT)
Dept: RADIOLOGY | Facility: HOSPITAL | Age: 69
Discharge: HOME | End: 2019-07-29
Attending: NURSE PRACTITIONER
Payer: MEDICARE

## 2019-07-29 DIAGNOSIS — R92.8 ABNORMAL MAMMOGRAM: ICD-10-CM

## 2019-07-29 PROCEDURE — 77061 MAMMO DIGITAL DIAGNOSTIC LEFT WITH TOMOSYNTHESIS_CAD: ICD-10-PCS | Mod: 26,LT,, | Performed by: RADIOLOGY

## 2019-07-29 PROCEDURE — 76642 ULTRASOUND BREAST LIMITED: CPT | Mod: TC,LT

## 2019-07-29 PROCEDURE — 77061 BREAST TOMOSYNTHESIS UNI: CPT | Mod: 26,LT,, | Performed by: RADIOLOGY

## 2019-07-29 PROCEDURE — 77065 MAMMO DIGITAL DIAGNOSTIC LEFT WITH TOMOSYNTHESIS_CAD: ICD-10-PCS | Mod: 26,LT,, | Performed by: RADIOLOGY

## 2019-07-29 PROCEDURE — 77065 DX MAMMO INCL CAD UNI: CPT | Mod: 26,LT,, | Performed by: RADIOLOGY

## 2019-07-29 PROCEDURE — 77061 BREAST TOMOSYNTHESIS UNI: CPT | Mod: TC,LT

## 2019-07-29 PROCEDURE — 76642 ULTRASOUND BREAST LIMITED: CPT | Mod: 26,LT,, | Performed by: RADIOLOGY

## 2019-07-29 PROCEDURE — 77065 DX MAMMO INCL CAD UNI: CPT | Mod: TC,LT

## 2019-07-29 PROCEDURE — 76642 US BREAST LEFT LIMITED: ICD-10-PCS | Mod: 26,LT,, | Performed by: RADIOLOGY

## 2019-08-20 ENCOUNTER — HOSPITAL ENCOUNTER (OUTPATIENT)
Dept: RADIOLOGY | Facility: HOSPITAL | Age: 69
Discharge: HOME | End: 2019-08-20
Attending: INTERNAL MEDICINE
Payer: MEDICARE

## 2019-08-20 DIAGNOSIS — K56.609 SBO (SMALL BOWEL OBSTRUCTION): ICD-10-CM

## 2019-08-20 DIAGNOSIS — K59.00 CN (CONSTIPATION): ICD-10-CM

## 2019-08-20 DIAGNOSIS — D50.9 ANEMIA, IRON DEFICIENCY: ICD-10-CM

## 2019-08-20 DIAGNOSIS — R14.0 ABDOMINAL DISTENTION: ICD-10-CM

## 2019-08-20 DIAGNOSIS — D12.2 BENIGN NEOPLASM OF ASCENDING COLON: ICD-10-CM

## 2019-08-20 DIAGNOSIS — D50.9 ANEMIA, IRON DEFICIENCY: Primary | ICD-10-CM

## 2019-08-20 PROCEDURE — 74019 RADEX ABDOMEN 2 VIEWS: CPT | Mod: TC

## 2019-08-20 PROCEDURE — 74019 XR ABDOMEN FLAT AND ERECT: ICD-10-PCS | Mod: 26,,, | Performed by: RADIOLOGY

## 2019-08-20 PROCEDURE — 74019 RADEX ABDOMEN 2 VIEWS: CPT | Mod: 26,,, | Performed by: RADIOLOGY

## 2020-05-13 ENCOUNTER — HOSPITAL ENCOUNTER (OUTPATIENT)
Dept: RADIOLOGY | Facility: HOSPITAL | Age: 70
Discharge: HOME | End: 2020-05-13
Attending: INTERNAL MEDICINE
Payer: MEDICARE

## 2020-05-13 DIAGNOSIS — R93.89 ABNORMAL CT SCAN: Primary | ICD-10-CM

## 2020-05-13 DIAGNOSIS — R93.89 ABNORMAL CT SCAN: ICD-10-CM

## 2020-05-13 PROCEDURE — 73502 X-RAY EXAM HIP UNI 2-3 VIEWS: CPT | Mod: 26,RT,, | Performed by: RADIOLOGY

## 2020-05-13 PROCEDURE — 73502 X-RAY EXAM HIP UNI 2-3 VIEWS: CPT | Mod: TC,RT

## 2020-05-13 PROCEDURE — 73502 XR HIP 2 VIEW RIGHT: ICD-10-PCS | Mod: 26,RT,, | Performed by: RADIOLOGY

## 2020-07-13 ENCOUNTER — HOSPITAL ENCOUNTER (OUTPATIENT)
Dept: RADIOLOGY | Facility: HOSPITAL | Age: 70
Discharge: HOME | End: 2020-07-13
Attending: INTERNAL MEDICINE
Payer: COMMERCIAL

## 2020-07-13 DIAGNOSIS — M81.0 OSTEOPOROSIS: ICD-10-CM

## 2020-07-13 PROCEDURE — 77080 DEXA BONE DENSITY SPINE HIP: ICD-10-PCS | Mod: 26,,, | Performed by: RADIOLOGY

## 2020-07-13 PROCEDURE — 77080 DXA BONE DENSITY AXIAL: CPT | Mod: 26,,, | Performed by: RADIOLOGY

## 2020-07-13 PROCEDURE — 77080 DXA BONE DENSITY AXIAL: CPT | Mod: TC

## 2020-07-31 ENCOUNTER — HOSPITAL ENCOUNTER (OUTPATIENT)
Dept: RADIOLOGY | Facility: HOSPITAL | Age: 70
Discharge: HOME | End: 2020-07-31
Attending: NURSE PRACTITIONER
Payer: MEDICARE

## 2020-07-31 VITALS — WEIGHT: 165 LBS | HEIGHT: 65 IN | BODY MASS INDEX: 27.49 KG/M2

## 2020-07-31 DIAGNOSIS — Z12.31 BREAST CANCER SCREENING BY MAMMOGRAM: ICD-10-CM

## 2020-07-31 PROCEDURE — 77067 MAMMO DIGITAL SCREENING BILAT WITH TOMOSYNTHESIS_CAD: ICD-10-PCS | Mod: 26,,, | Performed by: RADIOLOGY

## 2020-07-31 PROCEDURE — 77063 BREAST TOMOSYNTHESIS BI: CPT | Mod: 26,,, | Performed by: RADIOLOGY

## 2020-07-31 PROCEDURE — 77067 SCR MAMMO BI INCL CAD: CPT | Mod: TC

## 2020-07-31 PROCEDURE — 77067 SCR MAMMO BI INCL CAD: CPT | Mod: 26,,, | Performed by: RADIOLOGY

## 2020-07-31 PROCEDURE — 77063 MAMMO DIGITAL SCREENING BILAT WITH TOMOSYNTHESIS_CAD: ICD-10-PCS | Mod: 26,,, | Performed by: RADIOLOGY

## 2021-08-02 ENCOUNTER — HOSPITAL ENCOUNTER (OUTPATIENT)
Dept: RADIOLOGY | Facility: HOSPITAL | Age: 71
Discharge: HOME | End: 2021-08-02
Attending: NURSE PRACTITIONER
Payer: MEDICARE

## 2021-08-02 VITALS — HEIGHT: 65 IN | BODY MASS INDEX: 27.49 KG/M2 | WEIGHT: 165 LBS

## 2021-08-02 DIAGNOSIS — Z12.31 ENCOUNTER FOR SCREENING MAMMOGRAM FOR MALIGNANT NEOPLASM OF BREAST: ICD-10-CM

## 2021-08-02 PROCEDURE — 77063 MAMMO DIGITAL SCREENING BILAT WITH TOMO: ICD-10-PCS | Mod: 26,,, | Performed by: RADIOLOGY

## 2021-08-02 PROCEDURE — 77067 SCR MAMMO BI INCL CAD: CPT | Mod: TC

## 2021-08-02 PROCEDURE — 77063 BREAST TOMOSYNTHESIS BI: CPT | Mod: 26,,, | Performed by: RADIOLOGY

## 2021-08-02 PROCEDURE — 77067 MAMMO DIGITAL SCREENING BILAT WITH TOMO: ICD-10-PCS | Mod: 26,,, | Performed by: RADIOLOGY

## 2021-08-02 PROCEDURE — 77067 SCR MAMMO BI INCL CAD: CPT | Mod: 26,,, | Performed by: RADIOLOGY

## 2022-08-09 ENCOUNTER — HOSPITAL ENCOUNTER (OUTPATIENT)
Dept: RADIOLOGY | Facility: HOSPITAL | Age: 72
Discharge: HOME | End: 2022-08-09
Attending: INTERNAL MEDICINE
Payer: MEDICARE

## 2022-08-09 DIAGNOSIS — M85.80 OSTEOPENIA: ICD-10-CM

## 2022-08-09 PROCEDURE — 77080 DXA BONE DENSITY AXIAL: CPT | Mod: TC

## 2022-08-18 ENCOUNTER — HOSPITAL ENCOUNTER (OUTPATIENT)
Dept: RADIOLOGY | Facility: HOSPITAL | Age: 72
Discharge: HOME | End: 2022-08-18
Attending: NURSE PRACTITIONER
Payer: MEDICARE

## 2022-08-18 VITALS — WEIGHT: 165 LBS | BODY MASS INDEX: 27.49 KG/M2 | HEIGHT: 65 IN

## 2022-08-18 DIAGNOSIS — Z12.31 ENCOUNTER FOR SCREENING MAMMOGRAM FOR MALIGNANT NEOPLASM OF BREAST: ICD-10-CM

## 2022-08-18 PROCEDURE — 77063 BREAST TOMOSYNTHESIS BI: CPT | Mod: 26,,, | Performed by: RADIOLOGY

## 2022-08-18 PROCEDURE — 77067 MAMMO DIGITAL SCREENING BILAT WITH TOMO: ICD-10-PCS | Mod: 26,,, | Performed by: RADIOLOGY

## 2022-08-18 PROCEDURE — 77067 SCR MAMMO BI INCL CAD: CPT | Mod: 26,,, | Performed by: RADIOLOGY

## 2022-08-18 PROCEDURE — 77063 MAMMO DIGITAL SCREENING BILAT WITH TOMO: ICD-10-PCS | Mod: 26,,, | Performed by: RADIOLOGY

## 2022-08-18 PROCEDURE — 77063 BREAST TOMOSYNTHESIS BI: CPT | Mod: TC

## 2023-02-07 ENCOUNTER — LAB VISIT (OUTPATIENT)
Dept: LAB | Facility: HOSPITAL | Age: 73
End: 2023-02-07
Attending: INTERNAL MEDICINE
Payer: MEDICARE

## 2023-02-07 DIAGNOSIS — M85.80 OSTEOPENIA: Primary | ICD-10-CM

## 2023-02-07 LAB
25(OH)D3+25(OH)D2 SERPL-MCNC: 40 NG/ML (ref 30–96)
ALBUMIN SERPL BCP-MCNC: 3.6 G/DL (ref 3.5–5.2)
ALP SERPL-CCNC: 87 U/L (ref 55–135)
ALT SERPL W/O P-5'-P-CCNC: 23 U/L (ref 10–44)
ANION GAP SERPL CALC-SCNC: 9 MMOL/L (ref 8–16)
AST SERPL-CCNC: 21 U/L (ref 10–40)
BILIRUB SERPL-MCNC: 0.4 MG/DL (ref 0.1–1)
BUN SERPL-MCNC: 14 MG/DL (ref 8–23)
CALCIUM SERPL-MCNC: 9.1 MG/DL (ref 8.7–10.5)
CHLORIDE SERPL-SCNC: 107 MMOL/L (ref 95–110)
CO2 SERPL-SCNC: 26 MMOL/L (ref 23–29)
CREAT SERPL-MCNC: 0.8 MG/DL (ref 0.5–1.4)
EST. GFR  (NO RACE VARIABLE): >60 ML/MIN/1.73 M^2
GLUCOSE SERPL-MCNC: 90 MG/DL (ref 70–110)
POTASSIUM SERPL-SCNC: 3.7 MMOL/L (ref 3.5–5.1)
PROT SERPL-MCNC: 7.5 G/DL (ref 6–8.4)
SODIUM SERPL-SCNC: 142 MMOL/L (ref 136–145)

## 2023-02-07 PROCEDURE — 80053 COMPREHEN METABOLIC PANEL: CPT | Performed by: INTERNAL MEDICINE

## 2023-02-07 PROCEDURE — 36415 COLL VENOUS BLD VENIPUNCTURE: CPT | Performed by: INTERNAL MEDICINE

## 2023-02-07 PROCEDURE — 82306 VITAMIN D 25 HYDROXY: CPT | Performed by: INTERNAL MEDICINE

## 2023-06-25 ENCOUNTER — HOSPITAL ENCOUNTER (EMERGENCY)
Facility: HOSPITAL | Age: 73
Discharge: HOME | End: 2023-06-25
Attending: SURGERY
Payer: MEDICARE

## 2023-06-25 VITALS
HEART RATE: 74 BPM | RESPIRATION RATE: 18 BRPM | DIASTOLIC BLOOD PRESSURE: 88 MMHG | BODY MASS INDEX: 28.73 KG/M2 | TEMPERATURE: 98 F | OXYGEN SATURATION: 97 % | WEIGHT: 172.63 LBS | SYSTOLIC BLOOD PRESSURE: 170 MMHG

## 2023-06-25 DIAGNOSIS — S92.405A NONDISPLACED UNSPECIFIED FRACTURE OF LEFT GREAT TOE, INITIAL ENCOUNTER FOR CLOSED FRACTURE: Primary | ICD-10-CM

## 2023-06-25 DIAGNOSIS — M79.675 GREAT TOE PAIN, LEFT: ICD-10-CM

## 2023-06-25 PROCEDURE — 99283 EMERGENCY DEPT VISIT LOW MDM: CPT

## 2023-06-25 RX ORDER — IBUPROFEN 800 MG/1
800 TABLET ORAL EVERY 6 HOURS PRN
Qty: 20 TABLET | Refills: 0 | Status: SHIPPED | OUTPATIENT
Start: 2023-06-25

## 2023-06-26 NOTE — ED PROVIDER NOTES
Encounter Date: 6/25/2023       History     Chief Complaint   Patient presents with    Toe Injury     Pt c/c of pain to left great toe after hitting it on a tomato cage in her garden on Friday.  Swelling and bruising noted to base of great toe.  Pain worsened when ambulating and palpating base of toe.       Angela Veliz is a 72 y.o. female with left toe pain today  Patient stubbed her left great toe while working in her garden this week  Patient with continued left great toe pain since the accidental toe injury  Bruising to the base of the left great toe with no gross deformity noted  Good distal pulses & capillary refill, good toe ROM on ER evaluation    Review of patient's allergies indicates:   Allergen Reactions    Iodine and iodide containing products Itching and Swelling     Seafood - throat swells, eyes watery, sneezing, itchy throat     Past Medical History:   Diagnosis Date    Arthritis     Asthma     Breast cyst     Difficult intubation     Endometrial cancer     Hypertension     Osteopenia      Past Surgical History:   Procedure Laterality Date    ABCESS DRAINAGE      boil on buttocks    BARTHOLIN GLAND CYST EXCISION      COLONOSCOPY  2013; 2014    3 polyps    COLONOSCOPY N/A 6/30/2016    Procedure: COLONOSCOPY;  Surgeon: Geraldo Sweeney MD;  Location: [facility];  Service: Endoscopy;  Laterality: N/A;    DILATION AND CURETTAGE OF UTERUS  1975    HYSTERECTOMY  1991    RODRIGO BSO-fibroids    JOINT REPLACEMENT Left 2012    Partial replacement    TONSILLECTOMY, ADENOIDECTOMY  1957     Family History   Problem Relation Age of Onset    Breast cancer Maternal Grandmother     Cancer Father         Lung    Diabetes Mother     Breast cancer Mother     Hypertension Mother     Heart disease Mother     Cancer Sister         Uterine    Breast cancer Maternal Aunt     Colon cancer Neg Hx     Ovarian cancer Neg Hx      Social History     Tobacco Use    Smoking status: Never    Smokeless tobacco: Never    Substance Use Topics    Alcohol use: No    Drug use: No     Review of Systems   Constitutional: Negative.    HENT: Negative.     Eyes: Negative.    Respiratory: Negative.     Cardiovascular: Negative.    Gastrointestinal: Negative.    Genitourinary:  Negative for dysuria, urgency and vaginal discharge.   Musculoskeletal:         (+) left great toe pain   Skin: Negative.    Neurological: Negative.    Psychiatric/Behavioral: Negative.     All other systems reviewed and are negative.    Physical Exam     Initial Vitals [06/25/23 1943]   BP Pulse Resp Temp SpO2   (!) 175/90 71 16 97.9 °F (36.6 °C) 97 %      MAP       --         Physical Exam    Nursing note and vitals reviewed.  Constitutional: Vital signs are normal. She appears well-developed and well-nourished. She is cooperative.   HENT:   Head: Normocephalic and atraumatic.   Right Ear: External ear normal.   Left Ear: External ear normal.   Nose: Nose normal.   Mouth/Throat: Oropharynx is clear and moist.   Eyes: Conjunctivae, EOM and lids are normal. Pupils are equal, round, and reactive to light.   Neck: Trachea normal and phonation normal. Neck supple. No JVD present.   Normal range of motion.   Full passive range of motion without pain.     Cardiovascular:  Normal rate, regular rhythm, S1 normal, S2 normal, normal heart sounds, intact distal pulses and normal pulses.           Pulmonary/Chest: Effort normal and breath sounds normal.   Abdominal: Abdomen is soft and flat. Bowel sounds are normal.   Musculoskeletal:         General: Normal range of motion.      Cervical back: Full passive range of motion without pain, normal range of motion and neck supple.     Neurological: She is alert and oriented to person, place, and time. She has normal strength.   Skin: Skin is intact. Capillary refill takes less than 2 seconds.   (+) minor bruising around the base of the left great toe       ED Course   Procedures  Labs Reviewed - No data to display       Imaging  Results              X-Ray Foot Complete Left (Final result)  Result time 06/25/23 20:17:25      Final result by Júnior Carrillo MD (06/25/23 20:17:25)                   Impression:      1. Well corticated ossific structure along the medial aspect of the 4th digit D IP joint.  Small avulsion injury not excluded.  Correlation with any focal tenderness recommended.      Electronically signed by: Júnior Carrillo MD  Date:    06/25/2023  Time:    20:17               Narrative:    EXAMINATION:  XR FOOT COMPLETE 3 VIEW LEFT    CLINICAL HISTORY:  .  Pain in left toe(s)    TECHNIQUE:  AP, lateral and oblique views of the left foot were performed.    COMPARISON:  None    FINDINGS:  Three views left foot.    There is osteopenia.  No dislocation.  There is a well corticated ossific structure along the medial aspect of the 4th toe at the level of the D IP joint.  Correlation with any focal tenderness recommended, small avulsion not excluded.                                       Medications - No data to display  Medical Decision Making:   Initial Assessment:   Accidentally injured/stubbed her left great toe this weekend  Continued bruising & pain with no obvious deformity of the toe    Differential Diagnosis:   Sprain, strain, fracture, dislocation, soft tissue injury    Clinical Tests:   Radiological Study: Ordered and Reviewed    ED Management & Risk of Complications, Morbidity, Mortality:  Small cortical fracture at the left great toe likely based on clinical exam  Ice, elevate, activity as tolerated with no strenuous activity advised  This patient does not need to be hospitalized on ER evaluation today  The patient's diagnosis is not limited by social determinants of health  Does not require surgery or procedure (major or minor), no risk factors  Patient counseled to follow-up with primary care physician 48 hours   Patient counseled to return to the ER with any concerning symptoms                         Clinical  Impression:   Final diagnoses:  [M79.675] Great toe pain, left  [S92.405A] Nondisplaced unspecified fracture of left great toe, initial encounter for closed fracture (Primary)        ED Disposition Condition    Discharge Stable          ED Prescriptions       Medication Sig Dispense Start Date End Date Auth. Provider    ibuprofen (ADVIL,MOTRIN) 800 MG tablet Take 1 tablet (800 mg total) by mouth every 6 (six) hours as needed for Pain. 20 tablet 6/25/2023 -- Mikey Garza MD          Follow-up Information       Follow up With Specialties Details Why Contact Info    Rigoberto Koenig MD General Practice Schedule an appointment as soon as possible for a visit in 2 days  [address] [place] [id number]  [phone]               Mikey Garza MD  06/25/23 2026

## 2023-08-23 ENCOUNTER — HOSPITAL ENCOUNTER (OUTPATIENT)
Dept: RADIOLOGY | Facility: HOSPITAL | Age: 73
Discharge: HOME | End: 2023-08-23
Attending: NURSE PRACTITIONER
Payer: MEDICARE

## 2023-08-23 VITALS — HEIGHT: 65 IN | WEIGHT: 172 LBS | BODY MASS INDEX: 28.66 KG/M2

## 2023-08-23 DIAGNOSIS — Z12.31 ENCOUNTER FOR SCREENING MAMMOGRAM FOR MALIGNANT NEOPLASM OF BREAST: ICD-10-CM

## 2023-08-23 PROCEDURE — 77067 SCR MAMMO BI INCL CAD: CPT | Mod: TC

## 2023-08-23 PROCEDURE — 77067 MAMMO DIGITAL SCREENING BILAT WITH TOMO: ICD-10-PCS | Mod: 26,,, | Performed by: RADIOLOGY

## 2023-08-23 PROCEDURE — 77067 SCR MAMMO BI INCL CAD: CPT | Mod: 26,,, | Performed by: RADIOLOGY

## 2023-08-23 PROCEDURE — 77063 BREAST TOMOSYNTHESIS BI: CPT | Mod: 26,,, | Performed by: RADIOLOGY

## 2023-08-23 PROCEDURE — 77063 MAMMO DIGITAL SCREENING BILAT WITH TOMO: ICD-10-PCS | Mod: 26,,, | Performed by: RADIOLOGY

## 2023-09-06 ENCOUNTER — HOSPITAL ENCOUNTER (OUTPATIENT)
Dept: RADIOLOGY | Facility: HOSPITAL | Age: 73
Discharge: HOME | End: 2023-09-06
Attending: NURSE PRACTITIONER
Payer: MEDICARE

## 2023-09-06 DIAGNOSIS — R92.8 OTHER ABNORMAL AND INCONCLUSIVE FINDINGS ON DIAGNOSTIC IMAGING OF BREAST: ICD-10-CM

## 2023-09-06 PROCEDURE — 77065 DX MAMMO INCL CAD UNI: CPT | Mod: TC,LT

## 2023-09-06 PROCEDURE — 76642 ULTRASOUND BREAST LIMITED: CPT | Mod: TC,LT

## 2023-09-06 PROCEDURE — 77065 DX MAMMO INCL CAD UNI: CPT | Mod: 26,LT,, | Performed by: RADIOLOGY

## 2023-09-06 PROCEDURE — 76642 US BREAST LEFT LIMITED: ICD-10-PCS | Mod: 26,LT,, | Performed by: RADIOLOGY

## 2023-09-06 PROCEDURE — 77061 BREAST TOMOSYNTHESIS UNI: CPT | Mod: 26,LT,, | Performed by: RADIOLOGY

## 2023-09-06 PROCEDURE — 76642 ULTRASOUND BREAST LIMITED: CPT | Mod: 26,LT,, | Performed by: RADIOLOGY

## 2023-09-06 PROCEDURE — 77061 MAMMO DIGITAL DIAGNOSTIC LEFT WITH TOMO: ICD-10-PCS | Mod: 26,LT,, | Performed by: RADIOLOGY

## 2023-09-06 PROCEDURE — 77065 MAMMO DIGITAL DIAGNOSTIC LEFT WITH TOMO: ICD-10-PCS | Mod: 26,LT,, | Performed by: RADIOLOGY

## 2024-01-15 ENCOUNTER — LAB VISIT (OUTPATIENT)
Dept: LAB | Facility: HOSPITAL | Age: 74
End: 2024-01-15
Attending: INTERNAL MEDICINE
Payer: MEDICARE

## 2024-01-15 DIAGNOSIS — M85.80 OSTEOPENIA: Primary | ICD-10-CM

## 2024-01-15 LAB
ALBUMIN SERPL BCP-MCNC: 3.6 G/DL (ref 3.5–5.2)
ALP SERPL-CCNC: 88 U/L (ref 55–135)
ALT SERPL W/O P-5'-P-CCNC: 23 U/L (ref 10–44)
ANION GAP SERPL CALC-SCNC: 7 MMOL/L (ref 8–16)
AST SERPL-CCNC: 24 U/L (ref 10–40)
BILIRUB SERPL-MCNC: 0.4 MG/DL (ref 0.1–1)
BUN SERPL-MCNC: 20 MG/DL (ref 8–23)
CALCIUM SERPL-MCNC: 9.2 MG/DL (ref 8.7–10.5)
CHLORIDE SERPL-SCNC: 106 MMOL/L (ref 95–110)
CO2 SERPL-SCNC: 28 MMOL/L (ref 23–29)
CREAT SERPL-MCNC: 0.9 MG/DL (ref 0.5–1.4)
EST. GFR  (NO RACE VARIABLE): >60 ML/MIN/1.73 M^2
GLUCOSE SERPL-MCNC: 87 MG/DL (ref 70–110)
POTASSIUM SERPL-SCNC: 4 MMOL/L (ref 3.5–5.1)
PROT SERPL-MCNC: 7.7 G/DL (ref 6–8.4)
SODIUM SERPL-SCNC: 141 MMOL/L (ref 136–145)

## 2024-01-15 PROCEDURE — 82652 VIT D 1 25-DIHYDROXY: CPT | Performed by: INTERNAL MEDICINE

## 2024-01-15 PROCEDURE — 36415 COLL VENOUS BLD VENIPUNCTURE: CPT | Performed by: INTERNAL MEDICINE

## 2024-01-15 PROCEDURE — 80053 COMPREHEN METABOLIC PANEL: CPT | Performed by: INTERNAL MEDICINE

## 2024-01-22 LAB — 1,25(OH)2D3 SERPL-MCNC: 37 PG/ML (ref 20–79)

## 2024-08-12 ENCOUNTER — HOSPITAL ENCOUNTER (OUTPATIENT)
Dept: RADIOLOGY | Facility: HOSPITAL | Age: 74
Discharge: HOME | End: 2024-08-12
Attending: INTERNAL MEDICINE
Payer: MEDICARE

## 2024-08-12 DIAGNOSIS — M81.0 POST-MENOPAUSAL OSTEOPOROSIS: ICD-10-CM

## 2024-08-12 DIAGNOSIS — M85.80 OSTEOPENIA: ICD-10-CM

## 2024-08-12 PROCEDURE — 77080 DXA BONE DENSITY AXIAL: CPT | Mod: TC

## 2024-08-12 PROCEDURE — 77080 DXA BONE DENSITY AXIAL: CPT | Mod: 26,,, | Performed by: RADIOLOGY

## 2024-09-09 ENCOUNTER — HOSPITAL ENCOUNTER (OUTPATIENT)
Dept: RADIOLOGY | Facility: HOSPITAL | Age: 74
Discharge: HOME | End: 2024-09-09
Attending: NURSE PRACTITIONER
Payer: MEDICARE

## 2024-09-09 VITALS — HEIGHT: 65 IN | WEIGHT: 172 LBS | BODY MASS INDEX: 28.66 KG/M2

## 2024-09-09 DIAGNOSIS — Z12.31 ENCOUNTER FOR SCREENING MAMMOGRAM FOR MALIGNANT NEOPLASM OF BREAST: ICD-10-CM

## 2024-09-09 PROCEDURE — 77067 SCR MAMMO BI INCL CAD: CPT | Mod: 26,,, | Performed by: RADIOLOGY

## 2024-09-09 PROCEDURE — 77063 BREAST TOMOSYNTHESIS BI: CPT | Mod: 26,,, | Performed by: RADIOLOGY

## 2024-09-09 PROCEDURE — 77063 BREAST TOMOSYNTHESIS BI: CPT | Mod: TC

## 2025-02-19 ENCOUNTER — HOSPITAL ENCOUNTER (OUTPATIENT)
Dept: RADIOLOGY | Facility: HOSPITAL | Age: 75
Discharge: HOME | End: 2025-02-19
Attending: NURSE PRACTITIONER
Payer: MEDICARE

## 2025-02-19 DIAGNOSIS — Z01.818 ENCOUNTER FOR OTHER PREPROCEDURAL EXAMINATION: ICD-10-CM

## 2025-02-19 PROCEDURE — 71046 X-RAY EXAM CHEST 2 VIEWS: CPT | Mod: TC

## 2025-02-27 ENCOUNTER — LAB VISIT (OUTPATIENT)
Dept: LAB | Facility: HOSPITAL | Age: 75
End: 2025-02-27
Attending: INTERNAL MEDICINE
Payer: MEDICARE

## 2025-02-27 DIAGNOSIS — M85.80 OSTEOPATHIA HYPEROSTOTICA CONGENITA: Primary | ICD-10-CM

## 2025-02-27 LAB
ALBUMIN SERPL BCP-MCNC: 3.8 G/DL (ref 3.5–5.2)
ALP SERPL-CCNC: 101 U/L (ref 40–150)
ALT SERPL W/O P-5'-P-CCNC: 24 U/L (ref 10–44)
ANION GAP SERPL CALC-SCNC: 9 MMOL/L (ref 8–16)
AST SERPL-CCNC: 24 U/L (ref 10–40)
BILIRUB SERPL-MCNC: 0.3 MG/DL (ref 0.1–1)
BUN SERPL-MCNC: 21 MG/DL (ref 8–23)
CALCIUM SERPL-MCNC: 8.8 MG/DL (ref 8.7–10.5)
CHLORIDE SERPL-SCNC: 107 MMOL/L (ref 95–110)
CO2 SERPL-SCNC: 26 MMOL/L (ref 23–29)
CREAT SERPL-MCNC: 1 MG/DL (ref 0.5–1.4)
EST. GFR  (NO RACE VARIABLE): 59 ML/MIN/1.73 M^2
GLUCOSE SERPL-MCNC: 93 MG/DL (ref 70–110)
POTASSIUM SERPL-SCNC: 3.9 MMOL/L (ref 3.5–5.1)
PROT SERPL-MCNC: 7.7 G/DL (ref 6–8.4)
SODIUM SERPL-SCNC: 142 MMOL/L (ref 136–145)

## 2025-02-27 PROCEDURE — 82306 VITAMIN D 25 HYDROXY: CPT | Performed by: INTERNAL MEDICINE

## 2025-02-27 PROCEDURE — 36415 COLL VENOUS BLD VENIPUNCTURE: CPT | Performed by: INTERNAL MEDICINE

## 2025-02-27 PROCEDURE — 80053 COMPREHEN METABOLIC PANEL: CPT | Performed by: INTERNAL MEDICINE

## 2025-02-28 LAB — 25(OH)D3+25(OH)D2 SERPL-MCNC: 39 NG/ML (ref 30–96)
